# Patient Record
Sex: MALE | Race: WHITE | NOT HISPANIC OR LATINO | URBAN - METROPOLITAN AREA
[De-identification: names, ages, dates, MRNs, and addresses within clinical notes are randomized per-mention and may not be internally consistent; named-entity substitution may affect disease eponyms.]

---

## 2025-06-13 ENCOUNTER — OFFICE (OUTPATIENT)
Dept: URBAN - METROPOLITAN AREA CLINIC 45 | Facility: CLINIC | Age: 75
End: 2025-06-13

## 2025-06-13 VITALS
SYSTOLIC BLOOD PRESSURE: 92 MMHG | HEIGHT: 70 IN | WEIGHT: 172 LBS | DIASTOLIC BLOOD PRESSURE: 48 MMHG | HEART RATE: 61 BPM

## 2025-06-13 DIAGNOSIS — R13.12 DYSPHAGIA, OROPHARYNGEAL PHASE: ICD-10-CM

## 2025-06-13 DIAGNOSIS — K59.00 CONSTIPATION: ICD-10-CM

## 2025-06-13 DIAGNOSIS — Z86.0101 HISTORY OF ADENOMATOUS POLYP OF COLON: ICD-10-CM

## 2025-06-13 DIAGNOSIS — R19.4 ALTERED BOWEL FUNCTION: ICD-10-CM

## 2025-06-13 PROCEDURE — 99204 OFFICE O/P NEW MOD 45 MIN: CPT | Performed by: INTERNAL MEDICINE

## 2025-06-13 NOTE — SERVICEHPINOTES
This is my first time evaluation of this 74-year-old gentleman.  Authorization is for colonoscopy however he has multiple other issues including swallowing and constipation.  I reviewed an upper endoscopy from February 2024 which showed a normal esophagus including biopsies.  Last colonoscopy was July 2020 and he had 1 small benign polyp.  Stools are reported irregular with scyballa pattern.  He reports he is on a multitude of supplements, up to 15 a day, and when he stopped these his bowel habits improved.  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  He occasionally uses MiraLAX.  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  A modified barium swallow study is ordered.  He notes regurgitation of recently eaten food when he bends over.  No esophageal dysphagia symptoms.